# Patient Record
Sex: MALE | Race: BLACK OR AFRICAN AMERICAN | NOT HISPANIC OR LATINO | Employment: OTHER | URBAN - METROPOLITAN AREA
[De-identification: names, ages, dates, MRNs, and addresses within clinical notes are randomized per-mention and may not be internally consistent; named-entity substitution may affect disease eponyms.]

---

## 2024-01-31 ENCOUNTER — TELEPHONE (OUTPATIENT)
Dept: NEUROLOGY | Facility: CLINIC | Age: 44
End: 2024-01-31

## 2024-04-17 ENCOUNTER — APPOINTMENT (OUTPATIENT)
Dept: NEUROLOGY | Facility: CLINIC | Age: 44
End: 2024-04-17

## 2024-05-03 ENCOUNTER — TELEPHONE (OUTPATIENT)
Dept: NEUROLOGY | Facility: CLINIC | Age: 44
End: 2024-05-03

## 2024-05-03 NOTE — LETTER
May 3, 2024       Patient: Abdoul Ray   YOB: 1980   Date of Visit: 5/3/2024       To Whom it May Concern:    I treat Abdoul Ray for multiple sclerosis.  He has a spastic gait with imbalance and has been unable to work because of this.  He is disabled from his former line of work because of it.    If you have questions, please obtain written permission from the patient, then contact me.  Thank you for your consideration.       Sincerely,     Dandre Kahn Jr., M.D., DEBBI       ______________________________________________________________________________________    Letter written

## 2024-07-15 ENCOUNTER — APPOINTMENT (OUTPATIENT)
Dept: NEUROLOGY | Facility: CLINIC | Age: 44
End: 2024-07-15
Payer: MEDICAID

## 2024-07-15 DIAGNOSIS — G35 MULTIPLE SCLEROSIS (MULTI): Primary | ICD-10-CM

## 2024-07-15 RX ORDER — DIMETHYL FUMARATE 240 MG/1
1 CAPSULE ORAL 2 TIMES DAILY
COMMUNITY
Start: 2022-05-18 | End: 2024-07-15 | Stop reason: SDUPTHER

## 2024-07-22 RX ORDER — DIMETHYL FUMARATE 240 MG/1
240 CAPSULE ORAL 2 TIMES DAILY
Qty: 180 CAPSULE | Refills: 0 | Status: SHIPPED | OUTPATIENT
Start: 2024-07-22 | End: 2024-10-20

## 2024-07-24 ENCOUNTER — APPOINTMENT (OUTPATIENT)
Dept: NEUROLOGY | Facility: CLINIC | Age: 44
End: 2024-07-24
Payer: MEDICAID

## 2024-08-06 ENCOUNTER — TELEPHONE (OUTPATIENT)
Dept: NEUROLOGY | Facility: CLINIC | Age: 44
End: 2024-08-06

## 2024-08-07 ENCOUNTER — APPOINTMENT (OUTPATIENT)
Dept: NEUROLOGY | Facility: CLINIC | Age: 44
End: 2024-08-07
Payer: MEDICAID

## 2024-08-07 VITALS
WEIGHT: 180 LBS | BODY MASS INDEX: 21.92 KG/M2 | SYSTOLIC BLOOD PRESSURE: 144 MMHG | HEART RATE: 79 BPM | TEMPERATURE: 97.1 F | HEIGHT: 76 IN | DIASTOLIC BLOOD PRESSURE: 85 MMHG

## 2024-08-07 DIAGNOSIS — G35 MULTIPLE SCLEROSIS (MULTI): Primary | ICD-10-CM

## 2024-08-07 PROCEDURE — 3008F BODY MASS INDEX DOCD: CPT | Performed by: PSYCHIATRY & NEUROLOGY

## 2024-08-07 PROCEDURE — 4004F PT TOBACCO SCREEN RCVD TLK: CPT | Performed by: PSYCHIATRY & NEUROLOGY

## 2024-08-07 PROCEDURE — 99213 OFFICE O/P EST LOW 20 MIN: CPT | Performed by: PSYCHIATRY & NEUROLOGY

## 2024-08-07 NOTE — PROGRESS NOTES
"NEUROLOGY OUTPATIENT FOLLOW-UP NOTE    Assessment/Plan   Diagnoses and all orders for this visit:  Multiple sclerosis (Multi)      IMPRESSION: Multiple sclerosis with ataxia and gait spasticity, stable since last visit other than left foot weakness that has been stable for months at this point.     PLAN: Continue dimethyl fumarate. Needs basic bloodwork for monitoring.  He says he wants to try Ocrevus, and I will consider if he worsens again.  I will see him in a year or prn.      Dandre Kahn Jr., M.D., FAAN   ----------    Subjective     Abdoul Ray is a 43 y.o. year old male here for follow-up.    I last saw him 18 months ago.  He reports that about a year ago, he developed left foot weakness, but it has been stable since then.  He remains with a spastic gait.  He wants to know whether he can change to Ocrevus (he calls it \"the injection that's twice a year\").  He denies other focal neurological symptoms including dysarthria, dysphagia, diplopia, other focal weakness, focal sensory change, ataxia, vertigo, or bowel/bladder incontinence, among others.      No past medical history on file.  No past surgical history on file.  Social History     Tobacco Use    Smoking status: Every Day     Types: Cigars    Smokeless tobacco: Never   Substance Use Topics    Alcohol use: Yes     Comment: Socially     family history is not on file.    Current Outpatient Medications:     dimethyl fumarate (Tecfidera) 240 mg capsule,delayed release(DR/EC) capsule, Take 1 capsule (240 mg) by mouth 2 times a day., Disp: 180 capsule, Rfl: 0  No Known Allergies    Objective     /85   Pulse 79   Temp 36.2 °C (97.1 °F)   Ht 1.93 m (6' 4\")   Wt 81.6 kg (180 lb)   BMI 21.91 kg/m²     CONSTITUTIONAL:  No acute distress    MENTAL STATUS:  Awake, alert, fully oriented to self, place, and time, with present short-term memory, good awareness of recent events, normal attention span, concentration, and fund of " knowledge.    SPEECH AND LANGUAGE:  Can name and repeat, follows all commands, has no dysarthria    CRANIAL NERVES:  II-Vision present, visual fields full to confrontational testing    III/IV/VI--EOMs are present in all directions.  Pupils are symmetrically reactive in dim light.  No ptosis.    V--Normal facial sensation.    VII--No facial asymmetry.    VIII--Hearing present to voice bilaterally.    IX/X--Symmetric soft palate rise.    XI--Normal trapezius power bilaterally.    XII--Tongue protrudes without deviation.    MOTOR:  Normal power, and bulk in both arms and both legs other than left 4/5 foot plantarflexion and dorsiflexion.  Bilateral leg hypertonia.    SENSORY:  Normal pin sensation in both arms and both legs without distal-proximal gradient, asymmetry, or spinal sensory level.    COORDINATION:  Minor left arm ataxia.  Normal finger-to-nose and heel-to-shin testing in the right arm and both legs.    REFLEXES are normal and symmetric at the biceps, triceps, brachioradialis, brisk at the patella, and clonic at the ankles.  The plantar responses are flexor.    GAIT is spastic gait with scissoring of the legs. No ataxia of gait, no shuffling, no steppage.       Dandre Kahn Jr., M.D., FAAN

## 2024-09-24 DIAGNOSIS — G35 MULTIPLE SCLEROSIS (MULTI): ICD-10-CM

## 2024-09-24 RX ORDER — DIMETHYL FUMARATE 240 MG/1
240 CAPSULE ORAL 2 TIMES DAILY
Qty: 60 CAPSULE | Refills: 11 | Status: SHIPPED | OUTPATIENT
Start: 2024-09-24 | End: 2025-09-24

## 2024-09-25 ENCOUNTER — PHARMACY VISIT (OUTPATIENT)
Dept: PHARMACY | Facility: CLINIC | Age: 44
End: 2024-09-25
Payer: MEDICAID

## 2024-09-25 ENCOUNTER — SPECIALTY PHARMACY (OUTPATIENT)
Dept: PHARMACY | Facility: CLINIC | Age: 44
End: 2024-09-25

## 2024-09-25 PROCEDURE — RXMED WILLOW AMBULATORY MEDICATION CHARGE

## 2024-10-03 ENCOUNTER — SPECIALTY PHARMACY (OUTPATIENT)
Dept: PHARMACY | Facility: CLINIC | Age: 44
End: 2024-10-03

## 2024-10-03 NOTE — PROGRESS NOTES
[facility] Clinical Note  Adboul Ray is a 44 y.o. male, who is on the specialty pharmacy service for management of:  Multiple Sclerosis Core.    Abdoul Ray is taking: Dimethyl fumarate .    Medication Receipt Date: 9/27/24  Medication Start Date (planned or actual): Re-capture patient. Not new to therapy     Abdoul was contacted on 10/3/2024 at 11:09 AM for a virtual pharmacy visit with encounter number [id number] from:   [facility]  [address] [place] [address]  Dept: [phone]  Dept Fax: [phone]    Abdoul was offered a Telemedicine Video visit or Telephone visit.  Abdoul consented to a telepharmacy visit, which was performed.    The most recent encounter visit with the referring prescriber Dr. Kahn on 9/24/24 (Date) was reviewed.  Pharmacy will continue to collaborate in the care of this patient with the referring prescriber Dr. Kahn.    General Assessment      Impression/Plan  IMPRESSION/PLAN:  Is patient high risk (potential patients:  pregnancy, geriatric, pediatric)?  No   Is laboratory follow-up needed? Yes, Hepatic function panel and CBC/diff ordered   Is a clinical intervention needed? No   Next reassessment date? 1/3/25  Additional comments: Re-capture patient to [facility]    Refer to the encounter summary report for documentation details about patient counseling and education.      Medication Adherence    The importance of adherence was discussed with the patient and they were advised to take the medication as prescribed by their provider. Patient was encouraged to call their physician's office if they have a question regarding a missed dose.        Patient was advised to contact the pharmacy if there are any changes to their medication list, including prescriptions, OTC medications, herbal products, or supplements. Patient was advised of [facility]'s dispensing process, refill  timeline, contact information ([phone]), and patient management follow up. Patient confirmed understanding of education conducted during assessment. All patient questions and concerns were addressed to the best of my ability. Patient was encouraged to contact the specialty pharmacy with any questions or concerns.    Confirmed follow-up outreaches are properly scheduled and reviewed goals of therapy with the patient.        Jericho Vigil, PharmD

## 2024-10-19 ENCOUNTER — SPECIALTY PHARMACY (OUTPATIENT)
Dept: PHARMACY | Facility: CLINIC | Age: 44
End: 2024-10-19

## 2024-11-29 ENCOUNTER — SPECIALTY PHARMACY (OUTPATIENT)
Dept: PHARMACY | Facility: CLINIC | Age: 44
End: 2024-11-29

## 2024-11-29 PROCEDURE — RXMED WILLOW AMBULATORY MEDICATION CHARGE

## 2024-11-30 ENCOUNTER — PHARMACY VISIT (OUTPATIENT)
Dept: PHARMACY | Facility: CLINIC | Age: 44
End: 2024-11-30
Payer: MEDICAID

## 2024-12-27 ENCOUNTER — SPECIALTY PHARMACY (OUTPATIENT)
Dept: PHARMACY | Facility: CLINIC | Age: 44
End: 2024-12-27

## 2024-12-27 PROCEDURE — RXMED WILLOW AMBULATORY MEDICATION CHARGE

## 2024-12-30 ENCOUNTER — SPECIALTY PHARMACY (OUTPATIENT)
Dept: NEUROLOGY | Facility: HOSPITAL | Age: 44
End: 2024-12-30
Payer: MEDICAID

## 2024-12-30 NOTE — PROGRESS NOTES
[facility] Clinical Note    Abdoul Ray is a 44 y.o. male, who is on the specialty pharmacy service for management of:  Multiple Sclerosis Core.    Abdoul Ray is taking: dimethyl fumarate 240 mg capsules.    Abdoul was contacted on 12/30/2024 at 2:13 PM for a virtual pharmacy visit with encounter number [id number] from:   [place] [id number]  [facility] 5TH FLOOR  [facility] [address]  Dept: [phone]  Dept Fax: [phone]  Loc: [phone]    Abdoul was offered a Telemedicine Video visit or Telephone visit.  Abdoul consented to a telephone visit, which was performed.    The most recent encounter visit with the referring prescriber Dandre Kahn MD on 8/7/2024 was reviewed.  Pharmacy will continue to collaborate in the care of this patient with the referring prescriber Dandre Kahn MD.    General Assessment      Impression/Plan  IMPRESSION/PLAN:  Is patient high risk (potential patients:  pregnancy, geriatric, pediatric)?  no  Is laboratory follow-up needed? no  Is a clinical intervention needed? Yes, patient was complaining of worsening muscle pain/tightness in his lower extremities. I recommended he follow up with his neurologist regarding prescribing baclofen or another muscle relaxant.  Next reassessment date? 3/31/2025  Additional comments: n/a    Refer to the encounter summary report for documentation details about patient counseling and education.      Medication Adherence    The importance of adherence was discussed with the patient and they were advised to take the medication as prescribed by their provider. Patient was encouraged to call their physician's office if they have a question regarding a missed dose.     QOL/Patient Satisfaction  Rate your quality of life on scale of 1-10: 7  Rate your satisfaction with  Specialty Pharmacy on scale of 1-10: 10 - Completely satisfied      Patient was advised  to contact the pharmacy if there are any changes to their medication list, including prescriptions, OTC medications, herbal products, or supplements. Patient was advised of [facility]'s dispensing process, refill timeline, contact information ([phone]), and patient management follow up. Patient confirmed understanding of education conducted during assessment. All patient questions and concerns were addressed to the best of my ability. Patient was encouraged to contact the specialty pharmacy with any questions or concerns.    Confirmed follow-up outreaches are properly scheduled and reviewed goals of therapy with the patient.        Jeb Flower, PharmD

## 2025-01-02 ENCOUNTER — PHARMACY VISIT (OUTPATIENT)
Dept: PHARMACY | Facility: CLINIC | Age: 45
End: 2025-01-02
Payer: MEDICAID

## 2025-01-28 PROCEDURE — RXMED WILLOW AMBULATORY MEDICATION CHARGE

## 2025-02-05 ENCOUNTER — SPECIALTY PHARMACY (OUTPATIENT)
Dept: PHARMACY | Facility: CLINIC | Age: 45
End: 2025-02-05

## 2025-02-10 ENCOUNTER — PHARMACY VISIT (OUTPATIENT)
Dept: PHARMACY | Facility: CLINIC | Age: 45
End: 2025-02-10
Payer: MEDICAID

## 2025-03-05 PROCEDURE — RXMED WILLOW AMBULATORY MEDICATION CHARGE

## 2025-03-21 ENCOUNTER — SPECIALTY PHARMACY (OUTPATIENT)
Dept: NEUROLOGY | Facility: HOSPITAL | Age: 45
End: 2025-03-21
Payer: MEDICAID

## 2025-03-21 NOTE — PROGRESS NOTES
[facility] Clinical Note  Patient Reassessment     Introduction  Abdoul Ray is a 44 y.o. male who is on the specialty pharmacy service for management of: Multiple Sclerosis Core.      [facility] supplied medication: Dimethyl Fumarate 240 mg capsules    Duration of therapy: Maintenance    The most recent encounter visit with the referring prescriber Dandre Kahn MD on 8/7/2024 was reviewed.  Pharmacy will continue to collaborate in the care of this patient with the referring prescriber.    Discussion  Abdoul was contacted on 3/21/2025 at 9:57 AM for a pharmacy visit with encounter number [id number] from:   [facility]  [address] [address]  [facility] [address]  Dept: [phone]  Dept Fax: [phone]  Loc: [phone]  Abdoul consented to a/an Telephone visit, which was performed.    Efficacy  Patient has developed new symptoms of condition: No  Patient/caregiver feels medication is affecting the disease state: MS is stable    Goals  Provided education on goals and possible outcomes of therapy:  Adherence with therapy  Timely completion of appropriate labs  Timely and appropriate follow up with provider  Identify and address medication interactions with presciption medications, OTC medications and supplements  Optimize or maintain quality of life  Neurology- MS/NMOSD: No new or active MRI lesions  No clinical relapses  Absence of confirmed disability progression   Patient has documented target(s) for goals of therapy: No    Tolerance  Patient has experienced side effects from this medication: No  Changes to current therapy regimen: No    The follow-up timeline was discussed. Every person responds to and reacts to therapy differently. Patient should be assessed for efficacy and tolerability in approximately: annually    Adherence  Patient Information  Informant: Self (Patient)  Demonstrates Understanding of Importance of  "Adherence: Yes  Does the patient have any barriers to self-administration (including physical and mental?): No  Support Network for Adherence: Family Member  Adherence Tools Used: Medication list  Medication Information  Patient Reported Missed Doses in the Last 4 Weeks: 3  Estimated Medication Adherence Level: %  Adherence Estimation Source: Claims history  Barriers to Adherence: No Problems identified   The importance of adherence was discussed and patient/caregiver was advised to take the medication as prescribed by their provider. Encouraged patient/caregiver to call physician's office or specialty pharmacy if they have a question regarding a missed dose.    General Assessment  Changes to home medications, OTCs or supplements: No  Current Outpatient Medications   Medication Sig Dispense Refill    dimethyl fumarate (Tecfidera) 240 mg capsule,delayed release(DR/EC) capsule Take 1 capsule (240 mg) by mouth 2 times a day. 60 capsule 11     No current facility-administered medications for this visit.     Reported new allergies: No  Reported new medical conditions: No  Additional monitoring reviewed: Neurology - MS/NMOSD - CBC-diff:   Lab Results   Component Value Date    WBC 6.5 06/21/2020    RBC 4.67 06/21/2020    HGB 15.3 06/21/2020    HCT 42.3 06/21/2020    MCV 91 06/21/2020    MCHC 36.2 (H) 06/21/2020     06/21/2020    RDW 12.8 06/21/2020    NEUTOPHILPCT 58.5 06/21/2020    IGPCT 0.2 06/21/2020    LYMPHOPCT 31.8 06/21/2020    MONOPCT 7.5 06/21/2020    EOSPCT 1.5 06/21/2020    BASOPCT 0.5 06/21/2020    NEUTROABS 3.83 06/21/2020    LYMPHSABS 2.08 06/21/2020    MONOSABS 0.49 06/21/2020    EOSABS 0.10 06/21/2020    BASOSABS 0.03 06/21/2020    and LFTs and bilirubin: No results found for: \"ALT\", \"AST\", \"GGT\", \"ALKPHOS\", \"BILITOT\"  Is laboratory follow up needed? No    Advised to contact the pharmacy if there are any changes to the patient's medication list, including prescriptions, OTC medications, herbal " products, or supplements.    Impression/Plan  This patient has not been identified as high risk due to Lack of high risk qualifiers.  The following action was taken: N/A.    QOL/Patient Satisfaction  Rate your quality of life on scale of 1-10: 5  Rate your satisfaction with  Specialty Pharmacy on scale of 1-10: 9    Provided contact information ([phone]) for [facility] and reviewed dispensing process, refill timeline and patient management follow up. Confirmed understanding of education conducted during assessment. All questions and concerns were addressed and patient/caregiver was encouraged to reach out for additional questions or concerns.    Based on the patient's diagnosis, medication list, progress towards goals, adherence, tolerance, and medication list, medication remains appropriate: Therapy remains appropriate (I attest)    Jeb Flower, PharmD

## 2025-03-28 ENCOUNTER — PHARMACY VISIT (OUTPATIENT)
Dept: PHARMACY | Facility: CLINIC | Age: 45
End: 2025-03-28
Payer: MEDICAID

## 2025-06-24 ENCOUNTER — SPECIALTY PHARMACY (OUTPATIENT)
Dept: NEUROLOGY | Facility: HOSPITAL | Age: 45
End: 2025-06-24
Payer: MEDICAID

## 2025-06-26 ENCOUNTER — SPECIALTY PHARMACY (OUTPATIENT)
Dept: PHARMACY | Facility: CLINIC | Age: 45
End: 2025-06-26

## 2025-06-26 PROCEDURE — RXMED WILLOW AMBULATORY MEDICATION CHARGE

## 2025-06-30 ENCOUNTER — PHARMACY VISIT (OUTPATIENT)
Dept: PHARMACY | Facility: CLINIC | Age: 45
End: 2025-06-30
Payer: MEDICAID

## 2025-08-01 ENCOUNTER — SPECIALTY PHARMACY (OUTPATIENT)
Dept: PHARMACY | Facility: CLINIC | Age: 45
End: 2025-08-01

## 2025-08-07 ENCOUNTER — APPOINTMENT (OUTPATIENT)
Dept: NEUROLOGY | Facility: CLINIC | Age: 45
End: 2025-08-07
Payer: MEDICAID

## 2025-08-08 ENCOUNTER — APPOINTMENT (OUTPATIENT)
Dept: NEUROLOGY | Facility: CLINIC | Age: 45
End: 2025-08-08
Payer: MEDICAID

## 2025-08-08 VITALS
DIASTOLIC BLOOD PRESSURE: 91 MMHG | HEART RATE: 74 BPM | BODY MASS INDEX: 21.92 KG/M2 | SYSTOLIC BLOOD PRESSURE: 133 MMHG | HEIGHT: 76 IN | WEIGHT: 180 LBS | TEMPERATURE: 96.8 F

## 2025-08-08 DIAGNOSIS — G35 MULTIPLE SCLEROSIS (MULTI): Primary | ICD-10-CM

## 2025-08-08 PROCEDURE — 3008F BODY MASS INDEX DOCD: CPT | Performed by: PSYCHIATRY & NEUROLOGY

## 2025-08-08 PROCEDURE — 99213 OFFICE O/P EST LOW 20 MIN: CPT | Performed by: PSYCHIATRY & NEUROLOGY

## 2025-08-08 NOTE — PROGRESS NOTES
"NEUROLOGY OUTPATIENT FOLLOW-UP NOTE    Assessment/Plan   Diagnoses and all orders for this visit:  Multiple sclerosis (Multi)      IMPRESSION: Stable multiple sclerosis with spastic gait.  He is disabled from his previous work.    PLAN:  Continue dimethyl fumarate for MS prophylaxis.  I advised him to take it twice daily for more effective prophylaxis.  I did discuss a change to monthly Kesimpta to make compliance easier, but he wishes to continue the current regimen.  I will see him in six months or prn.      Dandre Kahn Jr., M.D., FAAN   ----------    Subjective     Abdoul Ray is a 44 y.o. year old male here for follow-up.    No worsening of the spastic gait since last year.  He dates worsening from when he was incarcerated two years ago and was without disease modifying therapy.  He admits he isn't taking the dimethyl fumarate daily,  but he is taking it.  He denies other focal neurological symptoms including dysarthria, dysphagia, diplopia, other focal weakness, focal sensory change, ataxia, vertigo, or bowel/bladder incontinence, among others.      Medical History[1]  Surgical History[2]  Social History     Tobacco Use    Smoking status: Every Day     Types: Cigars    Smokeless tobacco: Never   Substance Use Topics    Alcohol use: Yes     Comment: Socially     family history is not on file.  Current Medications[3]  Allergies[4]    Objective     BP (!) 133/91   Pulse 74   Temp 36 °C (96.8 °F)   Ht 1.93 m (6' 4\")   Wt 81.6 kg (180 lb)   BMI 21.91 kg/m²     CONSTITUTIONAL:  No acute distress     MENTAL STATUS:  Awake, alert, fully oriented to self, place, and time, with present short-term memory, good awareness of recent events, normal attention span, concentration, and fund of knowledge.     SPEECH AND LANGUAGE:  Can name and repeat, follows all commands, has no dysarthria     CRANIAL NERVES:  II-Vision present, visual fields full to confrontational testing     III/IV/VI--EOMs are present in " all directions.  Pupils are symmetrically reactive in dim light.  No ptosis.     V--Normal facial sensation.     VII--No facial asymmetry.     VIII--Hearing present to voice bilaterally.     IX/X--Symmetric soft palate rise.     XI--Normal trapezius power bilaterally.     XII--Tongue protrudes without deviation.     MOTOR:  Normal power, and bulk in both arms and the right leg.  In the left leg, iliacus is 4/5, quads 5/5, hams 5/5, 4/5 foot plantarflexion and dorsiflexion.  Bilateral leg hypertonia, more so in the left leg.     SENSORY:  Normal pin sensation in both arms and both legs without distal-proximal gradient, asymmetry, or spinal sensory level.     COORDINATION:  Minor left arm ataxia.  Normal finger-to-nose and heel-to-shin testing in the right arm and both legs.     REFLEXES are normal and symmetric at the biceps, triceps, brachioradialis, brisk at the patella, and clonic at the ankles.  The plantar responses are flexor.     GAIT is abnormal because of spastic gait with scissoring of the legs. No ataxia, shuffling, or steppage.  Steadier with a single post cane.      Dandre Kahn Jr., M.D., FAAN         [1] No past medical history on file.  [2] No past surgical history on file.  [3]   Current Outpatient Medications:     dimethyl fumarate (Tecfidera) 240 mg capsule,delayed release(DR/EC), Take 1 capsule (240 mg) by mouth 2 times a day., Disp: 60 capsule, Rfl: 11  [4] No Known Allergies

## 2025-08-22 PROCEDURE — RXMED WILLOW AMBULATORY MEDICATION CHARGE

## 2025-08-28 ENCOUNTER — PHARMACY VISIT (OUTPATIENT)
Dept: PHARMACY | Facility: CLINIC | Age: 45
End: 2025-08-28
Payer: MEDICAID

## 2026-02-05 ENCOUNTER — APPOINTMENT (OUTPATIENT)
Dept: NEUROLOGY | Facility: CLINIC | Age: 46
End: 2026-02-05
Payer: MEDICAID